# Patient Record
Sex: FEMALE | Race: OTHER | URBAN - NONMETROPOLITAN AREA
[De-identification: names, ages, dates, MRNs, and addresses within clinical notes are randomized per-mention and may not be internally consistent; named-entity substitution may affect disease eponyms.]

---

## 2022-01-19 NOTE — PATIENT DISCUSSION
No sign of toxicity on exam. Advised to continue Hydroxychloroquine as prescribed. Follow up in 6 months for dilated exam with mac OCT/ 5 line mac OCT. Schedule 10-2 HVF at next visit.

## 2022-08-10 NOTE — PATIENT DISCUSSION
No sign of toxicity on exam. Advised to continue Hydroxychloroquine as prescribed. Follow up in 6 months for dilated exam with mac OCT/ 5 line mac OCT.

## 2023-04-26 ENCOUNTER — FOLLOW UP (OUTPATIENT)
Facility: LOCATION | Age: 58
End: 2023-04-26

## 2023-04-26 DIAGNOSIS — H35.371: ICD-10-CM

## 2023-04-26 DIAGNOSIS — E11.9: ICD-10-CM

## 2023-04-26 DIAGNOSIS — H25.813: ICD-10-CM

## 2023-04-26 PROCEDURE — 92012 INTRM OPH EXAM EST PATIENT: CPT

## 2023-04-26 ASSESSMENT — TONOMETRY
OD_IOP_MMHG: 10
OS_IOP_MMHG: 10

## 2023-04-26 ASSESSMENT — VISUAL ACUITY
OD_SC: 20/20-2
OS_SC: 20/25-1

## 2023-08-02 ENCOUNTER — EMERGENCY VISIT (OUTPATIENT)
Facility: LOCATION | Age: 58
End: 2023-08-02

## 2023-08-02 DIAGNOSIS — H26.493: ICD-10-CM

## 2023-08-02 DIAGNOSIS — E11.3293: ICD-10-CM

## 2023-08-02 DIAGNOSIS — H53.121: ICD-10-CM

## 2023-08-02 DIAGNOSIS — G45.3: ICD-10-CM

## 2023-08-02 DIAGNOSIS — H47.11: ICD-10-CM

## 2023-08-02 PROCEDURE — 92133 CPTRZD OPH DX IMG PST SGM ON: CPT

## 2023-08-02 PROCEDURE — 92014 COMPRE OPH EXAM EST PT 1/>: CPT

## 2023-08-02 ASSESSMENT — TONOMETRY
OD_IOP_MMHG: 16
OS_IOP_MMHG: 14

## 2023-08-02 ASSESSMENT — VISUAL ACUITY
OS_SC: 20/20
OD_SC: 20/25-1

## 2024-09-24 ENCOUNTER — COMPREHENSIVE EXAM (OUTPATIENT)
Age: 59
End: 2024-09-24

## 2024-09-24 DIAGNOSIS — H16.141: ICD-10-CM

## 2024-09-24 DIAGNOSIS — H53.121: ICD-10-CM

## 2024-09-24 DIAGNOSIS — E11.3293: ICD-10-CM

## 2024-09-24 DIAGNOSIS — G45.3: ICD-10-CM

## 2024-09-24 DIAGNOSIS — H47.11: ICD-10-CM

## 2024-09-24 DIAGNOSIS — H26.493: ICD-10-CM

## 2024-09-24 PROCEDURE — 92134 CPTRZ OPH DX IMG PST SGM RTA: CPT

## 2024-09-24 PROCEDURE — 92014 COMPRE OPH EXAM EST PT 1/>: CPT
